# Patient Record
Sex: FEMALE | Race: WHITE | Employment: OTHER | URBAN - METROPOLITAN AREA
[De-identification: names, ages, dates, MRNs, and addresses within clinical notes are randomized per-mention and may not be internally consistent; named-entity substitution may affect disease eponyms.]

---

## 2019-09-17 ENCOUNTER — HOSPITAL ENCOUNTER (OUTPATIENT)
Dept: PHYSICAL THERAPY | Age: 74
Discharge: HOME | End: 2019-09-17
Attending: ORTHOPAEDIC SURGERY
Payer: MEDICARE

## 2019-09-17 PROCEDURE — 97016 VASOPNEUMATIC DEVICE THERAPY: CPT | Performed by: PHYSICAL THERAPIST

## 2019-09-17 PROCEDURE — 97110 THERAPEUTIC EXERCISES: CPT | Performed by: PHYSICAL THERAPIST

## 2019-09-17 PROCEDURE — 97140 MANUAL THERAPY 1/> REGIONS: CPT | Performed by: PHYSICAL THERAPIST

## 2019-09-17 PROCEDURE — 97161 PT EVAL LOW COMPLEX 20 MIN: CPT | Performed by: PHYSICAL THERAPIST

## 2019-09-17 NOTE — PROGRESS NOTES
[facility]  [address]  [place], [address]  Phone: [phone]  Fax: [phone]    Plan of Care/Statement of Necessity for Physical Therapy Services  2-15    Patient name: Jose Manuel Nelson  : 1945  Provider#: [id number]  Referral source: Vivien Cleary MD      Medical/Treatment Diagnosis: Acute right ankle pain [M25.571]     Prior Hospitalization: see medical history     Comorbidities: see evaluation  Prior Level of Function: see evaluation  Medications: Verified on Patient Summary List  Start of Care: 19      Onset Date: 19   The Plan of Care and following information is based on the information from the initial evaluation. Assessment/ key information: Pt is a 72year old female presenting s/p R lateral malleolus fracture of R fibula on 19 from a fall. She will benefit from PT to address problem list below    Problem List: pain affecting function, decrease ROM, decrease strength, edema affecting function, impaired gait/ balance, decrease ADL/ functional abilitiies, decrease activity tolerance and decrease flexibility/ joint mobility   Treatment Plan may include any combination of the following: Therapeutic exercise, Therapeutic activities, Neuromuscular re-education, Physical agent/modality, Gait/balance training, Manual therapy, Patient education, Self Care training, Functional mobility training, Home safety training and Stair training  Patient / Family readiness to learn indicated by: asking questions, trying to perform skills and interest  Persons(s) to be included in education: patient (P)  Barriers to Learning/Limitations: None  Patient Goal (s): see evaluation  Patient Self Reported Health Status: good  Rehabilitation Potential: good    Long Term Goals:  To be accomplished in 6-8 weeks:  1) Pt will be independent in final HEP  2) Pt will be able to perform R SLS for >/=10 sec without pain in order to demonstrate improvement in proprioception  3) Pt will ambulate >/=100 ft in clinic with proper gait mechanics  4) Pt will ascend and descend >/=4 stairs with reciprocal gait pattern without R ankle pain  5) Pt will improve FOTO score to >/=62/100 in order to demonstrate improvement in functional mobility  6) Pt will demonstrate 5/5 R ankle strength all directions in order to assist in return to PLOF  7) Pt will improve R ankle DF to >/= +5 deg in order to be symmetrical with contralateral limb and assist in gait mechanics    Frequency / Duration: Patient to be seen 1-2 times per week for 6-8 weeks. Patient/ Caregiver education and instruction: self care, activity modification and exercises    [x]  Plan of care has been reviewed with PTA    Certification Period: 9/17/19- 12/11/19  Chilo Powers, PT 9/17/2019    ________________________________________________________________________    I certify that the above Therapy Services are being furnished while the patient is under my care. I agree with the treatment plan and certify that this therapy is necessary.     [facility] Signature:____________________  Date:____________Time: _________

## 2019-09-17 NOTE — PROGRESS NOTES
[facility]  [address], [place], [address]  Phone: [phone]  Fax: [phone]      PT INITIAL EVALUATION NOTE - [facility] 2-15    Patient Name: Glenn Lopez  Date:2019  : 1945  [x]  Patient  Verified  Payor: Eveline Bush / Plan: [place] MED[place]ARE PART [place] & B / Product Type: Medicare /    In time: 4 A  Out time: 11:40 A  Total Treatment Time (min): 65  Total Timed Codes (min): 25  1:1 Treatment Time ([address] only): 55   Visit #: 1     Treatment Area: Acute right ankle pain [M25.571]     Subjective: Any medication changes, allergies to medications, adverse drug reactions, diagnosis change, or new procedure performed?: [] No    [x] Yes (see summary sheet for update)    Date of onset/injury and chief c/o:    Pt presents s/p R fibular fracture on 19 after a fall. She states a few weeks ago she had bronchitis, stood up to use bathroom in the middle of the night and fell onto her R ankle. Did not lose consciousness. Went to PCP- had x-rays taken- \"sprial fracture on fibular\". She was fitted for a boot at ortho-on-call then f/u with Dr. Raad Hart. Dr. Joey Edmonds suggested for her to wear boot only when walking and PT. She states that she wears boot \"most of the time\", however usually not when in the house and going from room to room. She states she rolled it last week when she went outside to check on something in her garden. She has been icing, elevating a lot. She returns to MD tomorrow. She is not driving. She has to go up/down stairs sideways, one step at at time.     Pain:   8/10 max 3/10 min 6/10 now       Location and description of symptoms: R ankle    Current symptoms/chief complaint:   Aggravated by: inc'd WBing, walking, activity  Eased by: rest, ice, elevation    Diagnostic Tests: [] Lab work [x] X-rays    [] CT [] MRI     [] Other:  Results (per report of the patient): see above    PMH: Significant for diabetes, high BP, arthritis    Social/Recreation/Work: not currently working. States she stays busy around the house, with her garden, running errands    Prior level of function: No history of R ankle pain, able to walk up/down stairs and walk without a limp    Patient goal(s): \"strengthen my ankle, facilitate healing\"      Objective:      Observation/posture:   Pt stands with WBing L>R LE. Toe ou on R    Gait:   Pt ambulates without boot into clinic today. Demonstrates antalgic gait pattern-- dec'd WBing through R LE, dec'd R ankle DF, inc'd R toe out       ROM/Strength        AROM  Strength (1-5)   Right Left Right Left   Dorsiflexion 0 (p!) +6 NT 5   Plantarflexion 38 (p!) 65 NT 5   Inversion 20 (p!) 40 NT 5   Eversion 25 (p!) 32 NT 5   Knee Flexion WNL WNL NT 5   Knee Extension WNL WNL NT 5       Palpation:  TTP:  R lateral malleolus, R fibularis longus/brevis    Outcome Measure: FOTO see chart     Swelling:   Right (cm) Left (cm)   Mid Malleoli cirfumference 26 cm 24.5 cm       Special Tests:     Single Leg Balance/Stork Test:  NT. Unable to perform                Today's Treatment: :    15 min Therapeutic Exercise:  [x] See flow sheet : instructed in HEP   Rationale: increase ROM and increase strength to improve the patients ability to walk, perform daily chores with dec'd symptoms    10 min Manual Therapy: STM/TPR R fibularis longus, brevis  PROM R ankle DF/[place], inver/ever    Rationale: decrease pain, increase ROM, increase tissue extensibility and decrease edema  to improve the patients ability to walk, exercise, perform daily chores with dec'd symptoms    10 min-- [place], R ankle with ice pack around R calf.           With   [x] TE   [] TA   [] neuro   [] other: Patient Education: [x] Review HEP    [] Progressed/Changed HEP based on:   [] positioning   [] body mechanics   [] transfers   [x] heat/ice application    [x] other: lance/phys; PT POC; importance of performing HEP in order to maximize benefit of PT; use of ice for 15 min several times daily in order to decrease swelling; importance of compliance with wearing boot when WBing for inc'd bouts of time     Pain Level (0-10 scale) post treatment: \"good, cold\"    Assessment:   [x] See POC    Plan:   [x] See POC  [] Other:     Yaniv Stevens, PT, DPT  9/17/2019

## 2019-09-19 ENCOUNTER — APPOINTMENT (OUTPATIENT)
Dept: PHYSICAL THERAPY | Age: 74
End: 2019-09-19
Attending: ORTHOPAEDIC SURGERY
Payer: MEDICARE

## 2019-09-23 ENCOUNTER — HOSPITAL ENCOUNTER (OUTPATIENT)
Dept: PHYSICAL THERAPY | Age: 74
Discharge: HOME | End: 2019-09-23
Attending: ORTHOPAEDIC SURGERY
Payer: MEDICARE

## 2019-09-23 PROCEDURE — 97140 MANUAL THERAPY 1/> REGIONS: CPT | Performed by: PHYSICAL THERAPIST

## 2019-09-23 PROCEDURE — 97016 VASOPNEUMATIC DEVICE THERAPY: CPT | Performed by: PHYSICAL THERAPIST

## 2019-09-23 PROCEDURE — 97110 THERAPEUTIC EXERCISES: CPT | Performed by: PHYSICAL THERAPIST

## 2019-09-23 NOTE — PROGRESS NOTES
PT DAILY TREATMENT NOTE - [facility] 2-15    Patient Name: Osmel Myers  Date:2019  : 1945  [x]  Patient  Verified  Payor: [place] MEDICARE / Plan: VA MEDICARE PART A & B / Product Type: Medicare /    In time:235 P  Out time: 340 P  Total Treatment Time (min): 65  Total Timed Codes (min): 50  1:1 Treatment Time ( only): 50   Visit #:  2    Treatment Area: Acute right ankle pain [M25.571]    SUBJECTIVE  Pain Level (0-10 scale): 1  Any medication changes, allergies to medications, adverse drug reactions, diagnosis change, or new procedure performed?: [x] No    [] Yes (see summary sheet for update)  Subjective functional status/changes:   [] No changes reported  Pt states that she has been very compliant with wearing boot. She was in [address] [facility] this weekend with her  taking care of their 2 grandchildren for the weekend-- states it was a 3 level townhouse but she wore her boot the whole time.  She still feels like there is a lot of swelling in her ankle and she continues to get some pain    OBJECTIVE    Modality rationale: decrease edema, decrease inflammation and decrease pain to improve the patients ability to walk, exercise, perform daily chores with dec'd symptoms   Min Type Additional Details       [] Estim: []Att   []Unatt    []TENS instruct                  []IFC  []Premod   []NMES                     []Other:  []w/US   []w/ice   []w/heat  Position:  Location:       []  Traction: [] Cervical       []Lumbar                       [] Prone          []Supine                       []Intermittent   []Continuous Lbs:  [] before manual  [] after manual  []w/heat    []  Ultrasound: []Continuous   [] Pulsed                       at: []1MHz   []3MHz Location:  W/cm2:    [] Paraffin         Location:   []w/heat    []  Ice     []  Heat  []  Ice massage Position:  Location:    []  Laser  []  Other: Position:  Location:   15   [x]  Vasopneumatic Device  +compression in order to reduce edema Pressure:       [] lo [x] med [] hi   Temperature: 34     [x] Skin assessment post-treatment:  [x]intact []redness- no adverse reaction    []redness  adverse reaction:      35 min Therapeutic Exercise:  [x] See flow sheet : instructed in HEP   Rationale: increase ROM and increase strength to improve the patients ability to walk, perform daily chores with dec'd symptoms     15 min Manual Therapy: STM/TPR R fibularis longus, brevis  PROM R ankle DF/[place], inver/ever    Rationale: decrease pain, increase ROM, increase tissue extensibility and decrease edema  to improve the patients ability to walk, exercise, perform daily chores with dec'd symptoms     min Gait Training:  ___ feet with ___ device on level surfaces with ___ level of assist   Rationale: With   [x] TE   [] TA   [] neuro   [] other: Patient Education: [x] Review HEP    [] Progressed/Changed HEP based on:   [] positioning   [] body mechanics   [] transfers   [] heat/ice application    [] other:      Other Objective/Functional Measures: n/a     Pain Level (0-10 scale) post treatment: 1    ASSESSMENT/Changes in Function:   Challenged with inv/ever on [employer]. Updated HEP to include SLS clocks and tandem balance. Strongly encouraged pt to continue wearing boot at this time per MD recommendation    Patient will continue to benefit from skilled PT services to modify and progress therapeutic interventions, address functional mobility deficits, address ROM deficits, address strength deficits, analyze and address soft tissue restrictions, analyze and cue movement patterns and analyze and modify body mechanics/ergonomics to attain remaining goals.      [x]  See Plan of Care  []  See progress note/recertification  []  See Discharge Summary         Progress towards goals / Updated goals:  nt    PLAN  []  Upgrade activities as tolerated     [x]  Continue plan of care  []  Update interventions per flow sheet       []  Discharge due to:_  []  Other:_      Marygrace Mortimer, PT 9/23/2019

## 2019-09-25 ENCOUNTER — HOSPITAL ENCOUNTER (OUTPATIENT)
Dept: PHYSICAL THERAPY | Age: 74
Discharge: HOME | End: 2019-09-25
Attending: ORTHOPAEDIC SURGERY
Payer: MEDICARE

## 2019-09-25 PROCEDURE — 97016 VASOPNEUMATIC DEVICE THERAPY: CPT | Performed by: PHYSICAL THERAPY ASSISTANT

## 2019-09-25 PROCEDURE — 97535 SELF CARE MNGMENT TRAINING: CPT | Performed by: PHYSICAL THERAPY ASSISTANT

## 2019-09-25 PROCEDURE — 97140 MANUAL THERAPY 1/> REGIONS: CPT | Performed by: PHYSICAL THERAPY ASSISTANT

## 2019-09-25 NOTE — PROGRESS NOTES
PT DAILY TREATMENT NOTE - [facility] 2-15    Patient Name: Doug Castillo  Date:2019  : 1945  [x]  Patient  Verified  Payor: [place] MEDICARE / Plan: [place] MEDICARE PART A & B / Product Type: Medicare /    In time:11:00 AM Out time: 12:00  P  Total Treatment Time (min): 60  Total Timed Codes (min): 50  1:1 Treatment Time ( only): 25   Visit #:  3    Treatment Area: Acute right ankle pain [M25.571]    SUBJECTIVE  Pain Level (0-10 scale): 1  Any medication changes, allergies to medications, adverse drug reactions, diagnosis change, or new procedure performed?: [x] No    [] Yes (see summary sheet for update)  Subjective functional status/changes:   [] No changes reported  Pt states she did a lot of cleaning around the kitchen yesterday as she had ordered a new refrigerator. States she wore her boot the entire time but was feeling a little more swollen and sore today.      OBJECTIVE    Modality rationale: decrease edema, decrease inflammation and decrease pain to improve the patients ability to walk, exercise, perform daily chores with dec'd symptoms   Min Type Additional Details       [] Estim: []Att   []Unatt    []TENS instruct                  []IFC  []Premod   []NMES                     []Other:  []w/US   []w/ice   []w/heat  Position:  Location:       []  Traction: [] Cervical       []Lumbar                       [] Prone          []Supine                       []Intermittent   []Continuous Lbs:  [] before manual  [] after manual  []w/heat    []  Ultrasound: []Continuous   [] Pulsed                       at: []1MHz   []3MHz Location:  W/cm2:    [] Paraffin         Location:   []w/heat    []  Ice     []  Heat  []  Ice massage Position:  Location:    []  Laser  []  Other: Position:  Location:   10   [x]  Vasopneumatic Device (boot)  +compression in order to reduce edema Pressure:       [] lo [x] med [] hi   Temperature: 34     [x] Skin assessment post-treatment:  [x]intact []redness- no adverse reaction []redness  adverse reaction:      25 min Therapeutic Exercise:  [x] See flow sheet : instructed in HEP   Rationale: increase ROM and increase strength to improve the patients ability to walk, perform daily chores with dec'd symptoms     15 min Manual Therapy: STM/TPR R fibularis longus, brevis  PROM R ankle DF/[place], inver/ever    Rationale: decrease pain, increase ROM, increase tissue extensibility and decrease edema  to improve the patients ability to walk, exercise, perform daily chores with dec'd symptoms     min Gait Training:  ___ feet with ___ device on level surfaces with ___ level of assist   Rationale: With   [] TE   [] TA   [] neuro   [x] other: Self Care/Patient Education: [x] Review HEP    [] Progressed/Changed HEP based on:   [] positioning   [] body mechanics   [] transfers   [x] heat/ice application    [x] other: measured patients ankle for compression stockings and gave printout regarding appropriate size and to wear them during the day to reduce effusion to R ankle. Other Objective/Functional Measures:   Mid Calf Circumference: 15.5 inches  Malleoli level Circumference: 10.5 inches     Pain Level (0-10 scale) post treatment:  \"the boot really made it feel good. \"    ASSESSMENT/Changes in Function:   Patient tolerated exercises well today, slight increase in swelling today so encouraged patient to look into compression stockings to wear throughout the day to reduce swelling with increased activity. Continued to recommend patient wear her boot per MD recommendations. Patient will continue to benefit from skilled PT services to modify and progress therapeutic interventions, address functional mobility deficits, address ROM deficits, address strength deficits, analyze and address soft tissue restrictions, analyze and cue movement patterns and analyze and modify body mechanics/ergonomics to attain remaining goals.      [x]  See Plan of Care  []  See progress note/recertification  []  See Discharge Summary         Progress towards goals / Updated goals:  nt    PLAN  []  Upgrade activities as tolerated     [x]  Continue plan of care  []  Update interventions per flow sheet       []  Discharge due to:_  []  Other:_      Iliana Keys PTA 9/25/2019

## 2019-10-01 ENCOUNTER — APPOINTMENT (OUTPATIENT)
Dept: PHYSICAL THERAPY | Age: 74
End: 2019-10-01
Attending: ORTHOPAEDIC SURGERY
Payer: MEDICARE

## 2019-10-03 ENCOUNTER — HOSPITAL ENCOUNTER (OUTPATIENT)
Dept: PHYSICAL THERAPY | Age: 74
Discharge: HOME | End: 2019-10-03
Attending: ORTHOPAEDIC SURGERY
Payer: MEDICARE

## 2019-10-03 PROCEDURE — 97140 MANUAL THERAPY 1/> REGIONS: CPT | Performed by: PHYSICAL THERAPIST

## 2019-10-03 PROCEDURE — 97110 THERAPEUTIC EXERCISES: CPT | Performed by: PHYSICAL THERAPIST

## 2019-10-03 PROCEDURE — 97016 VASOPNEUMATIC DEVICE THERAPY: CPT | Performed by: PHYSICAL THERAPIST

## 2019-10-03 NOTE — PROGRESS NOTES
PT DAILY TREATMENT NOTE - [facility] 2-15    Patient Name: Thais Pate  Date:10/3/2019  : 1945  [x]  Patient  Verified  Payor: [place] MEDICARE / Plan: VA MEDICARE PART A & B / Product Type: Medicare /    In time:11:05 AM Out time: 12:05  P  Total Treatment Time (min): 60  Total Timed Codes (min): 40  1:1 Treatment Time ( [address] only): 45   Visit #:  4    Treatment Area: Acute right ankle pain [M25.571]    SUBJECTIVE  Pain Level (0-10 scale): 1  Any medication changes, allergies to medications, adverse drug reactions, diagnosis change, or new procedure performed?: [x] No    [] Yes (see summary sheet for update)  Subjective functional status/changes:   [] No changes reported  Pt reports that she has been weaning herself from the boot- doesn't wear it unless she will be walking/on her feet for an extended period of time or on uneven surfaces. She did not wear boot at all yesterday or this morning.  Overall feels that is it getting better  She has bronchitis, has been on anibiotics for 4 days, which is why she missed her last PT appointment    OBJECTIVE    Modality rationale: decrease edema, decrease inflammation and decrease pain to improve the patients ability to walk, exercise, perform daily chores with dec'd symptoms   Min Type Additional Details       [] Estim: []Att   []Unatt    []TENS instruct                  []IFC  []Premod   []NMES                     []Other:  []w/US   []w/ice   []w/heat  Position:  Location:       []  Traction: [] Cervical       []Lumbar                       [] Prone          []Supine                       []Intermittent   []Continuous Lbs:  [] before manual  [] after manual  []w/heat    []  Ultrasound: []Continuous   [] Pulsed                       at: []1MHz   []3MHz Location:  W/cm2:    [] Paraffin         Location:   []w/heat    []  Ice     []  Heat  []  Ice massage Position:  Location:    []  Laser  []  Other: Position:  Location:   10   [x]  Vasopneumatic Device (boot)  +compression in order to reduce edema Pressure:       [] lo [x] med [] hi   Temperature: 34     [x] Skin assessment post-treatment:  [x]intact []redness- no adverse reaction    []redness  adverse reaction:      25 min Therapeutic Exercise:  [x] See flow sheet : instructed in HEP   Rationale: increase ROM and increase strength to improve the patients ability to walk, perform daily chores with dec'd symptoms     15 min Manual Therapy: STM/TPR R fibularis longus, brevis  PROM R ankle DF/AR, inver/ever    Rationale: decrease pain, increase ROM, increase tissue extensibility and decrease edema  to improve the patients ability to walk, exercise, perform daily chores with dec'd symptoms     min Gait Training:  ___ feet with ___ device on level surfaces with ___ level of assist   Rationale: With   [] TE   [] TA   [] neuro   [x] other: Self Care/Patient Education: [x] Review HEP    [] Progressed/Changed HEP based on:   [] positioning   [] body mechanics   [] transfers   [x] heat/ice application    [x] other:      Other Objective/Functional Measures:   n/a     Pain Level (0-10 scale) post treatment:  0/10 \"feels good now! \"    ASSESSMENT/Changes in Function:   Challenged with SLS on foam today; encouraged pt wean from boot as tolerated. Patient will continue to benefit from skilled PT services to modify and progress therapeutic interventions, address functional mobility deficits, address ROM deficits, address strength deficits, analyze and address soft tissue restrictions, analyze and cue movement patterns and analyze and modify body mechanics/ergonomics to attain remaining goals.      [x]  See Plan of Care  []  See progress note/recertification  []  See Discharge Summary         Progress towards goals / Updated goals:  nt    PLAN  []  Upgrade activities as tolerated     [x]  Continue plan of care  []  Update interventions per flow sheet       []  Discharge due to:_  []  Other:_      Vashti Hong, PT 10/3/2019

## 2019-10-09 ENCOUNTER — HOSPITAL ENCOUNTER (OUTPATIENT)
Dept: PHYSICAL THERAPY | Age: 74
Discharge: HOME | End: 2019-10-09
Attending: ORTHOPAEDIC SURGERY
Payer: MEDICARE

## 2019-10-09 PROCEDURE — 97140 MANUAL THERAPY 1/> REGIONS: CPT | Performed by: PHYSICAL THERAPIST

## 2019-10-09 PROCEDURE — 97016 VASOPNEUMATIC DEVICE THERAPY: CPT | Performed by: PHYSICAL THERAPIST

## 2019-10-09 PROCEDURE — 97110 THERAPEUTIC EXERCISES: CPT | Performed by: PHYSICAL THERAPIST

## 2019-10-09 NOTE — PROGRESS NOTES
PT DAILY TREATMENT NOTE - [facility] 2-15    Patient Name: Agueda Agent  Date:10/9/2019  : 1945  [x]  Patient  Verified  Payor: VA MEDICARE / Plan: VA MEDICARE PART A & B / Product Type: Medicare /    In time: 12:05 Out time: 1:10  P  Total Treatment Time (min): 65  Total Timed Codes (min): 50  1:1 Treatment Time ( [address] only): 30   Visit #:  5    Treatment Area: Acute right ankle pain [M25.571]    SUBJECTIVE  Pain Level (0-10 scale): 1-2  Any medication changes, allergies to medications, adverse drug reactions, diagnosis change, or new procedure performed?: [x] No    [] Yes (see summary sheet for update)  Subjective functional status/changes:   [] No changes reported  Pt states that she saw MD-- she was not very happy bc he did not spend very much time with her. \"I had a lot of questions I wanted to ask him but didn't get to\". She states that she told him she was driving. He told her x-rays look good, that it would not be completely healed until 10-12 weeks. She is out of her boot completely, has lace up ankle brace to wear if needed. She went to her grandson's soccer game- had to walk across a field. States some increased soreness in ankle.  She still has some pain/soreness on the outside (lateral aspect) of the ankle    OBJECTIVE    Modality rationale: decrease edema, decrease inflammation and decrease pain to improve the patients ability to walk, exercise, perform daily chores with dec'd symptoms   Min Type Additional Details       [] Estim: []Att   []Unatt    []TENS instruct                  []IFC  []Premod   []NMES                     []Other:  []w/US   []w/ice   []w/heat  Position:  Location:       []  Traction: [] Cervical       []Lumbar                       [] Prone          []Supine                       []Intermittent   []Continuous Lbs:  [] before manual  [] after manual  []w/heat    []  Ultrasound: []Continuous   [] Pulsed                       at: []1MHz   []3MHz Location:  W/cm2:    [] Paraffin Location:   []w/heat    []  Ice     []  Heat  []  Ice massage Position:  Location:    []  Laser  []  Other: Position:  Location:   15   [x]  Vasopneumatic Device (boot)  +compression in order to reduce edema Pressure:       [] lo [x] med [] hi   Temperature: 34     [x] Skin assessment post-treatment:  [x]intact []redness- no adverse reaction    []redness  adverse reaction:      35 min Therapeutic Exercise:  [x] See flow sheet : progressed per flow sheet   Rationale: increase ROM and increase strength to improve the patients ability to walk, perform daily chores with dec'd symptoms     15 min Manual Therapy: STM/TPR R fibularis longus, brevis  PROM R ankle DF/[place], inver/ever    Rationale: decrease pain, increase ROM, increase tissue extensibility and decrease edema  to improve the patients ability to walk, exercise, perform daily chores with dec'd symptoms        With   [] TE   [] TA   [] neuro   [x] other: Self Care/Patient Education: [x] Review HEP    [] Progressed/Changed HEP based on:   [] positioning   [] body mechanics   [] transfers   [x] heat/ice application    [x] other:      Other Objective/Functional Measures:   n/a     Pain Level (0-10 scale) post treatment:  0/10     ASSESSMENT/Changes in Function:   Added resistance band with ankle AROM. Progressing well with balance, ankle strengthening    Patient will continue to benefit from skilled PT services to modify and progress therapeutic interventions, address functional mobility deficits, address ROM deficits, address strength deficits, analyze and address soft tissue restrictions, analyze and cue movement patterns and analyze and modify body mechanics/ergonomics to attain remaining goals.      [x]  See Plan of Care  []  See progress note/recertification  []  See Discharge Summary         Progress towards goals / Updated goals:  nt    PLAN  []  Upgrade activities as tolerated     [x]  Continue plan of care  []  Update interventions per flow sheet []  Discharge due to:_  []  Other:_      Ronaldo Harris, PT 10/9/2019

## 2019-10-11 ENCOUNTER — HOSPITAL ENCOUNTER (OUTPATIENT)
Dept: PHYSICAL THERAPY | Age: 74
Discharge: HOME | End: 2019-10-11
Attending: ORTHOPAEDIC SURGERY
Payer: MEDICARE

## 2019-10-11 PROCEDURE — 97140 MANUAL THERAPY 1/> REGIONS: CPT | Performed by: PHYSICAL THERAPIST

## 2019-10-11 PROCEDURE — 97016 VASOPNEUMATIC DEVICE THERAPY: CPT | Performed by: PHYSICAL THERAPIST

## 2019-10-11 PROCEDURE — 97110 THERAPEUTIC EXERCISES: CPT | Performed by: PHYSICAL THERAPIST

## 2019-10-11 NOTE — PROGRESS NOTES
PT DAILY TREATMENT NOTE - [facility] 2-15    Patient Name: Abel Escamilla  Date:10/11/2019  : 1945  [x]  Patient  Verified  Payor: [place] MED[place] / Plan: [place] MEDICARE PART A & B / Product Type: Medicare /    In time: 11:00 A Out time: 12:10  P  Total Treatment Time (min): 70  Total Timed Codes (min): 55  1:1 Treatment Time ( [address] only): 55   Visit #:  6    Treatment Area: Acute right ankle pain [M25.571]    SUBJECTIVE  Pain Level (0-10 scale): \"just a ache\"  Any medication changes, allergies to medications, adverse drug reactions, diagnosis change, or new procedure performed?: [x] No    [] Yes (see summary sheet for update)  Subjective functional status/changes:   [] No changes reported  Pt reports that her ankle is doing well, \"I wouldn't say its painful, just an ache\".  She states she is still cautious with how much activity she is doing    OBJECTIVE    Modality rationale: decrease edema, decrease inflammation and decrease pain to improve the patients ability to walk, exercise, perform daily chores with dec'd symptoms   Min Type Additional Details       [] Estim: []Att   []Unatt    []TENS instruct                  []IFC  []Premod   []NMES                     []Other:  []w/US   []w/ice   []w/heat  Position:  Location:       []  Traction: [] Cervical       []Lumbar                       [] Prone          []Supine                       []Intermittent   []Continuous Lbs:  [] before manual  [] after manual  []w/heat    []  Ultrasound: []Continuous   [] Pulsed                       at: []1MHz   []3MHz Location:  W/cm2:    [] Paraffin         Location:   []w/heat    []  Ice     []  Heat  []  Ice massage Position:  Location:    []  Laser  []  Other: Position:  Location:   15   [x]  Vasopneumatic Device (boot)  +compression in order to reduce edema Pressure:       [] lo [x] med [] hi   Temperature: 34     [x] Skin assessment post-treatment:  [x]intact []redness- no adverse reaction    []redness  adverse reaction:      40 min Therapeutic Exercise:  [x] See flow sheet : progressed per flow sheet   Rationale: increase ROM and increase strength to improve the patients ability to walk, perform daily chores with dec'd symptoms     15 min Manual Therapy: STM/TPR R fibularis longus, brevis  PROM R ankle DF/DE, inver/ever    Rationale: decrease pain, increase ROM, increase tissue extensibility and decrease edema  to improve the patients ability to walk, exercise, perform daily chores with dec'd symptoms        With   [] TE   [] TA   [] neuro   [x] other: Self Care/Patient Education: [x] Review HEP    [] Progressed/Changed HEP based on:   [] positioning   [] body mechanics   [] transfers   [x] heat/ice application    [x] other:      Other Objective/Functional Measures:   n/a     Pain Level (0-10 scale) post treatment:  0/10     ASSESSMENT/Changes in Function:   Progressing well with balance training and progression of strength exercises    Patient will continue to benefit from skilled PT services to modify and progress therapeutic interventions, address functional mobility deficits, address ROM deficits, address strength deficits, analyze and address soft tissue restrictions, analyze and cue movement patterns and analyze and modify body mechanics/ergonomics to attain remaining goals.      [x]  See Plan of Care  []  See progress note/recertification  []  See Discharge Summary         Progress towards goals / Updated goals:  nt    PLAN  []  Upgrade activities as tolerated     [x]  Continue plan of care  []  Update interventions per flow sheet       []  Discharge due to:_  []  Other:_      Remedios Hassan, PT 10/11/2019

## 2019-10-15 ENCOUNTER — HOSPITAL ENCOUNTER (OUTPATIENT)
Dept: PHYSICAL THERAPY | Age: 74
Discharge: HOME | End: 2019-10-15
Attending: ORTHOPAEDIC SURGERY
Payer: MEDICARE

## 2019-10-15 PROCEDURE — 97016 VASOPNEUMATIC DEVICE THERAPY: CPT | Performed by: PHYSICAL THERAPIST

## 2019-10-15 PROCEDURE — 97110 THERAPEUTIC EXERCISES: CPT | Performed by: PHYSICAL THERAPIST

## 2019-10-15 PROCEDURE — 97140 MANUAL THERAPY 1/> REGIONS: CPT | Performed by: PHYSICAL THERAPIST

## 2019-10-18 ENCOUNTER — HOSPITAL ENCOUNTER (OUTPATIENT)
Dept: PHYSICAL THERAPY | Age: 74
Discharge: HOME | End: 2019-10-18
Attending: ORTHOPAEDIC SURGERY
Payer: MEDICARE

## 2019-10-18 PROCEDURE — 97140 MANUAL THERAPY 1/> REGIONS: CPT | Performed by: PHYSICAL THERAPIST

## 2019-10-18 PROCEDURE — 97110 THERAPEUTIC EXERCISES: CPT | Performed by: PHYSICAL THERAPIST

## 2019-10-18 NOTE — PROGRESS NOTES
PT DAILY TREATMENT/Progress NOTE - [facility] 2-15    Patient Name: Nilesh Olivo  Date:10/18/2019  : 1945  [x]  Patient  Verified  Payor: [place] MEDICARE / Plan: [place] MEDICARE PART A & B / Product Type: Medicare /    In time: 12:30 P Out time: 140 P  Total Treatment Time (min): 70  Total Timed Codes (min): 60  1:1 Treatment Time ([facility] only): 40   Visit #:  8    Treatment Area: Acute right ankle pain [M25.571]    SUBJECTIVE  Pain Level (0-10 scale): < 1/10  Any medication changes, allergies to medications, adverse drug reactions, diagnosis change, or new procedure performed?: [x] No    [] Yes (see summary sheet for update)  Subjective functional status/changes:   [] No changes reported  Pt reports that she has \"a little something\" in ankle today- \"just a little, its not bad at all\"    OBJECTIVE    Modality rationale: decrease edema, decrease inflammation and decrease pain to improve the patients ability to walk, exercise, perform daily chores with dec'd symptoms   Min Type Additional Details       [] Estim: []Att   []Unatt    []TENS instruct                  []IFC  []Premod   []NMES                     []Other:  []w/US   []w/ice   []w/heat  Position:  Location:       []  Traction: [] Cervical       []Lumbar                       [] Prone          []Supine                       []Intermittent   []Continuous Lbs:  [] before manual  [] after manual  []w/heat    []  Ultrasound: []Continuous   [] Pulsed                       at: []1MHz   []3MHz Location:  W/cm2:    [] Paraffin         Location:   []w/heat    []  Ice     []  Heat  []  Ice massage Position:  Location:    []  Laser  []  Other: Position:  Location:   10   [x]  Vasopneumatic Device (boot)  +compression in order to reduce edema Pressure:       [] lo [x] med [] hi   Temperature: 34     [x] Skin assessment post-treatment:  [x]intact []redness- no adverse reaction    []redness  adverse reaction:      45 min Therapeutic Exercise:  [x] See flow sheet : progressed per flow sheet; measurements taken for MD note. Rationale: increase ROM and increase strength to improve the patients ability to walk, perform daily chores with dec'd symptoms     15 min Manual Therapy: STM/TPR R fibularis longus, brevis  PROM R ankle DF/AR, inver/ever    Rationale: decrease pain, increase ROM, increase tissue extensibility and decrease edema  to improve the patients ability to walk, exercise, perform daily chores with dec'd symptoms        With   [] TE   [] TA   [] neuro   [x] other: Self Care/Patient Education: [x] Review HEP    [] Progressed/Changed HEP based on:   [] positioning   [] body mechanics   [] transfers   [x] heat/ice application    [x] other:      Other Objective/Functional Measures:   Pt ambulates independently without AD. Good gait mechanics     ROM/Strength                                          AROM             Strength (1-5)    Right Left Right Left   Dorsiflexion +8 +6 NT 5   Plantarflexion 50 65 NT 5   Inversion 28 40 NT 5   Eversion 26 32 NT 5   Knee Flexion WNL WNL NT 5   Knee Extension WNL WNL NT 5       Palpation:  Min TTP R lateral malleolus and distal tibula  No TTP R fibularis longus/brevis     Outcome Measure: FOTO see chart      Swelling:    Right (cm) Left (cm)   Mid Malleoli cirfumference 25.5 cm 24.5 cm         Special Tests:     Single Leg Balance/Stork Test:  R: approx 3 sec  L: approx 3 sec      Pain Level (0-10 scale) post treatment:  0/10     ASSESSMENT/Changes in Function:   Pt has completed 8 skilled PT visits. Pt demonstrates improvement in R ankle strength, ROM, gait mechanics, and proprioception.  Pt to continue PT 1-2x/wk for additional 3-4 weeks to progress strengthening, proprioceptive exercises    Patient will continue to benefit from skilled PT services to modify and progress therapeutic interventions, address functional mobility deficits, address ROM deficits, address strength deficits, analyze and address soft tissue restrictions, analyze and cue movement patterns and analyze and modify body mechanics/ergonomics to attain remaining goals. [x]  See Plan of Care  []  See progress note/recertification  []  See Discharge Summary         Progress towards goals / Updated goals:  Long Term Goals:  To be accomplished in 6-8 weeks:  1) Pt will be independent in final HEP MET  2) Pt will be able to perform R SLS for >/=10 sec without pain in order to demonstrate improvement in proprioception progressing  3) Pt will ambulate >/=100 ft in clinic with proper gait mechanics MET  4) Pt will ascend and descend >/=4 stairs with reciprocal gait pattern without R ankle pain  na  5) Pt will improve FOTO score to >/=62/100 in order to demonstrate improvement in functional mobility na  6) Pt will demonstrate 5/5 R ankle strength all directions in order to assist in return to PLOF MET  7) Pt will improve R ankle DF to >/= +5 deg in order to be symmetrical with contralateral limb and assist in gait mechanics MET    PLAN  []  Upgrade activities as tolerated     [x]  Continue plan of care  []  Update interventions per flow sheet       []  Discharge due to:_  [x]  Other:_  Continue PT 1-2x/wk for additional 3-4 weeks    Skye Kelly, PT 10/18/2019

## 2019-10-21 ENCOUNTER — HOSPITAL ENCOUNTER (OUTPATIENT)
Dept: PHYSICAL THERAPY | Age: 74
Discharge: HOME | End: 2019-10-21
Attending: ORTHOPAEDIC SURGERY
Payer: MEDICARE

## 2019-10-21 PROCEDURE — 97140 MANUAL THERAPY 1/> REGIONS: CPT | Performed by: PHYSICAL THERAPIST

## 2019-10-21 PROCEDURE — 97110 THERAPEUTIC EXERCISES: CPT | Performed by: PHYSICAL THERAPIST

## 2019-10-23 ENCOUNTER — HOSPITAL ENCOUNTER (OUTPATIENT)
Dept: PHYSICAL THERAPY | Age: 74
Discharge: HOME | End: 2019-10-23
Attending: ORTHOPAEDIC SURGERY
Payer: MEDICARE

## 2019-10-23 PROCEDURE — 97140 MANUAL THERAPY 1/> REGIONS: CPT | Performed by: PHYSICAL THERAPIST

## 2019-10-23 PROCEDURE — 97110 THERAPEUTIC EXERCISES: CPT | Performed by: PHYSICAL THERAPIST

## 2019-10-23 NOTE — PROGRESS NOTES
PT DAILY TREATMENT NOTE - [facility] 2-15    Patient Name: Agueda Agent  Date:10/23/2019  : 1945  [x]  Patient  Verified  Payor: [place] MEDICARE / Plan: [place] MEDICARE PART A & B / Product Type: Medicare /    In time: 1:00 PM   Out time: 2:25 PM  Total Treatment Time (min): 85  Total Timed Codes (min): 65  1:1 Treatment Time ([address] only): 30   Visit #:  10    Treatment Area: Acute right ankle pain [M25.571]    SUBJECTIVE  Pain Level (0-10 scale):  0/10  Any medication changes, allergies to medications, adverse drug reactions, diagnosis change, or new procedure performed?: [x] No    [] Yes (see summary sheet for update)  Subjective functional status/changes:   [] No changes reported  Pt reports that her ankle has been feeling much better; overall doing well. She is leaving town to visit her sister later this week, returns to [place] on .      OBJECTIVE    Modality rationale: decrease edema, decrease inflammation and decrease pain to improve the patients ability to walk, exercise, perform daily chores with dec'd symptoms   Min Type Additional Details       [] Estim: []Att   []Unatt    []TENS instruct                  []IFC  []Premod   []NMES                     []Other:  []w/US   []w/ice   []w/heat  Position:  Location:       []  Traction: [] Cervical       []Lumbar                       [] Prone          []Supine                       []Intermittent   []Continuous Lbs:  [] before manual  [] after manual  []w/heat    []  Ultrasound: []Continuous   [] Pulsed                       at: []1MHz   []3MHz Location:  W/cm2:    [] Paraffin         Location:   []w/heat   10 [x]  Ice     []  Heat  []  Ice massage Position:  Location:    []  Laser  []  Other: Position:  Location:   --   [x]  Vasopneumatic Device (boot)  +compression in order to reduce edema Pressure:       [] lo [x] med [] hi   Temperature: 34     [x] Skin assessment post-treatment:  [x]intact []redness- no adverse reaction    []redness  adverse reaction:      50 min Therapeutic Exercise:  [x] See flow sheet : progressed per flow sheet   Rationale: increase ROM and increase strength to improve the patients ability to walk, perform daily chores with dec'd symptoms     15 min Manual Therapy: STM/TPR R fibularis longus, brevis  PROM R ankle DF/NE, inver/ever    Rationale: decrease pain, increase ROM, increase tissue extensibility and decrease edema  to improve the patients ability to walk, exercise, perform daily chores with dec'd symptoms        With   [] TE   [] TA   [] neuro   [x] other: Self Care/Patient Education: [x] Review HEP    [] Progressed/Changed HEP based on:   [] positioning   [] body mechanics   [] transfers   [x] heat/ice application    [x] other:      Other Objective/Functional Measures:   n/a    Pain Level (0-10 scale) post treatment:  0/10     ASSESSMENT/Changes in Function:   Marlene addition of [facility] step ups well today. Encouraged pt to make f/u PT appt after MD appointment. Patient will continue to benefit from skilled PT services to modify and progress therapeutic interventions, address functional mobility deficits, address ROM deficits, address strength deficits, analyze and address soft tissue restrictions, analyze and cue movement patterns and analyze and modify body mechanics/ergonomics to attain remaining goals. [x]  See Plan of Care  []  See progress note/recertification  []  See Discharge Summary         Progress towards goals / Updated goals:  Long Term Goals:  To be accomplished in 6-8 weeks:  1) Pt will be independent in final HEP MET  2) Pt will be able to perform R SLS for >/=10 sec without pain in order to demonstrate improvement in proprioception progressing  3) Pt will ambulate >/=100 ft in clinic with proper gait mechanics MET  4) Pt will ascend and descend >/=4 stairs with reciprocal gait pattern without R ankle pain  na  5) Pt will improve FOTO score to >/=62/100 in order to demonstrate improvement in functional mobility na  6) Pt will demonstrate 5/5 R ankle strength all directions in order to assist in return to PLOF MET  7) Pt will improve R ankle DF to >/= +5 deg in order to be symmetrical with contralateral limb and assist in gait mechanics MET    PLAN  []  Upgrade activities as tolerated     [x]  Continue plan of care  []  Update interventions per flow sheet       []  Discharge due to:_  [x]  Other:_  Continue PT 1-2x/wk for additional 3-4 weeks    Vashti Hong, PT 10/23/2019

## 2019-10-28 ENCOUNTER — APPOINTMENT (OUTPATIENT)
Dept: PHYSICAL THERAPY | Age: 74
End: 2019-10-28
Attending: ORTHOPAEDIC SURGERY
Payer: MEDICARE

## 2019-10-30 ENCOUNTER — APPOINTMENT (OUTPATIENT)
Dept: PHYSICAL THERAPY | Age: 74
End: 2019-10-30
Attending: ORTHOPAEDIC SURGERY
Payer: MEDICARE

## 2019-11-07 ENCOUNTER — HOSPITAL ENCOUNTER (OUTPATIENT)
Dept: PHYSICAL THERAPY | Age: 74
Discharge: HOME | End: 2019-11-07
Attending: ORTHOPAEDIC SURGERY
Payer: MEDICARE

## 2019-11-07 PROCEDURE — 97110 THERAPEUTIC EXERCISES: CPT | Performed by: PHYSICAL THERAPIST

## 2019-11-07 PROCEDURE — 97140 MANUAL THERAPY 1/> REGIONS: CPT | Performed by: PHYSICAL THERAPIST

## 2019-11-07 PROCEDURE — 97016 VASOPNEUMATIC DEVICE THERAPY: CPT | Performed by: PHYSICAL THERAPIST

## 2019-11-07 NOTE — PROGRESS NOTES
PT DAILY TREATMENT NOTE - [facility] 2-15    Patient Name: Xander El  Date:2019  : 1945  [x]  Patient  Verified  Payor: [place] MEDICARE / Plan: [place] MEDICARE PART A & B / Product Type: Medicare /    In time: 2:00 PM   Out time: 3:05 PM  Total Treatment Time (min): 65  Total Timed Codes (min): 50  1:1 Treatment Time ( [address] only): 50   Visit #:  11    Treatment Area: Acute right ankle pain [M25.571]    SUBJECTIVE  Pain Level (0-10 scale):  0/10  Any medication changes, allergies to medications, adverse drug reactions, diagnosis change, or new procedure performed?: [x] No    [] Yes (see summary sheet for update)  Subjective functional status/changes:   [] No changes reported  Pt states that MD appt went well-- \"he said it is not quite 100% healed, but its going in that direction\".  Overall good report from MD.   Has been in [place] the past week visiting her sister-- states she felt good walking around/shopping with her sister    OBJECTIVE    Modality rationale: decrease edema, decrease inflammation and decrease pain to improve the patients ability to walk, exercise, perform daily chores with dec'd symptoms   Min Type Additional Details       [] Estim: []Att   []Unatt    []TENS instruct                  []IFC  []Premod   []NMES                     []Other:  []w/US   []w/ice   []w/heat  Position:  Location:       []  Traction: [] Cervical       []Lumbar                       [] Prone          []Supine                       []Intermittent   []Continuous Lbs:  [] before manual  [] after manual  []w/heat    []  Ultrasound: []Continuous   [] Pulsed                       at: []1MHz   []3MHz Location:  W/cm2:    [] Paraffin         Location:   []w/heat    [x]  Ice     []  Heat  []  Ice massage Position:  Location:    []  Laser  []  Other: Position:  Location:   10   [x]  Vasopneumatic Device (boot)  +compression in order to reduce edema Pressure:       [] lo [x] med [] hi   Temperature: 34     [x] Skin assessment post-treatment:  [x]intact []redness- no adverse reaction    []redness  adverse reaction:      35 min Therapeutic Exercise:  [x] See flow sheet : progressed per flow sheet   Rationale: increase ROM and increase strength to improve the patients ability to walk, perform daily chores with dec'd symptoms     15 min Manual Therapy: STM/TPR R fibularis longus, brevis  PROM R ankle DF/MI, inver/ever    Rationale: decrease pain, increase ROM, increase tissue extensibility and decrease edema  to improve the patients ability to walk, exercise, perform daily chores with dec'd symptoms        With   [] TE   [] TA   [] neuro   [x] other: Self Care/Patient Education: [x] Review HEP    [] Progressed/Changed HEP based on:   [] positioning   [] body mechanics   [] transfers   [x] heat/ice application    [x] other:      Other Objective/Functional Measures:   n/a    Pain Level (0-10 scale) post treatment:  0/10     ASSESSMENT/Changes in Function:   Slightly inc'd swelling R vs L ankle today. Progressing very well with strengthening program. Plan for 2 more visits, then D/C to HEP. Patient will continue to benefit from skilled PT services to modify and progress therapeutic interventions, address functional mobility deficits, address ROM deficits, address strength deficits, analyze and address soft tissue restrictions, analyze and cue movement patterns and analyze and modify body mechanics/ergonomics to attain remaining goals. [x]  See Plan of Care  []  See progress note/recertification  []  See Discharge Summary         Progress towards goals / Updated goals:  Long Term Goals:  To be accomplished in 6-8 weeks:  1) Pt will be independent in final HEP MET  2) Pt will be able to perform R SLS for >/=10 sec without pain in order to demonstrate improvement in proprioception progressing  3) Pt will ambulate >/=100 ft in clinic with proper gait mechanics MET  4) Pt will ascend and descend >/=4 stairs with reciprocal gait pattern without R ankle pain  na  5) Pt will improve FOTO score to >/=62/100 in order to demonstrate improvement in functional mobility na  6) Pt will demonstrate 5/5 R ankle strength all directions in order to assist in return to PLOF MET  7) Pt will improve R ankle DF to >/= +5 deg in order to be symmetrical with contralateral limb and assist in gait mechanics MET    PLAN  []  Upgrade activities as tolerated     [x]  Continue plan of care  []  Update interventions per flow sheet       []  Discharge due to:_  [x]  Other:_  Continue PT 1-2x/wk for additional 3-4 weeks    Onnie Morning, PT 11/7/2019

## 2019-11-12 ENCOUNTER — APPOINTMENT (OUTPATIENT)
Dept: PHYSICAL THERAPY | Age: 74
End: 2019-11-12
Attending: ORTHOPAEDIC SURGERY
Payer: MEDICARE

## 2019-11-18 ENCOUNTER — APPOINTMENT (OUTPATIENT)
Dept: PHYSICAL THERAPY | Age: 74
End: 2019-11-18
Attending: ORTHOPAEDIC SURGERY
Payer: MEDICARE

## 2019-11-20 ENCOUNTER — HOSPITAL ENCOUNTER (OUTPATIENT)
Dept: PHYSICAL THERAPY | Age: 74
Discharge: HOME | End: 2019-11-20
Attending: ORTHOPAEDIC SURGERY
Payer: MEDICARE

## 2019-11-20 PROCEDURE — 97110 THERAPEUTIC EXERCISES: CPT | Performed by: PHYSICAL THERAPIST

## 2019-11-20 NOTE — ANCILLARY DISCHARGE INSTRUCTIONS
[facility] [address] [facility] [place], [address] Phone: [phone]  Fax: [phone] Discharge Summary  2-15 Patient name: Audrey Vaughn  : 1945  Provider#:[id number] Referral source: Astrid Silva MD     
Medical/Treatment Diagnosis: Acute right ankle pain [M25.571] Prior Hospitalization: see medical history Comorbidities: see evaluation Prior Level of Function:see evaluation Medications: Verified on Patient Summary List 
 
Start of Care: 19      Onset Date:see evaluation Visits from Start of Care: 12     Missed Visits: see CC Reporting Period : 19 to 19 Summary of care:  
Pt ambulates independently without AD. Good gait mechanics 
  
ROM/Strength   
                                     AROM             Strength (1-5) 
  Right Left Right Left Dorsiflexion +8 +6 NT 5 Plantarflexion 58 65 NT 5 Inversion 28 30 NT 5 Eversion 26 32 NT 5 Knee Flexion WNL WNL NT 5 Knee Extension WNL WNL NT 5  
  
  
Palpation: 
N0 TTP R lateral malleolus and distal tibula No TTP R fibularis longus/brevis 
  
Outcome Measure: FOTO see chart  
  
Swelling: 
  Right (cm) Left (cm) Mid Malleoli cirfumference 25 cm 24.5 cm  
  
  
Special Tests: 
  
Single Leg Balance/Stork Test: 
R: approx 3 sec L: approx 3 sec 
  
Pain Level (0-10 scale) post treatment:  0/10 Progress towards goals Long Term Goals: To be accomplished in 6-8 weeks: 
1) Pt will be independent in final HEP MET 2) Pt will be able to perform R SLS for >/=10 sec without pain in order to demonstrate improvement in proprioception progressing 3) Pt will ambulate >/=100 ft in clinic with proper gait mechanics MET 4) Pt will ascend and descend >/=4 stairs with reciprocal gait pattern without R ankle pain  MET 5) Pt will improve FOTO score to >/=62/100 in order to demonstrate improvement in functional mobility  MET 
 6) Pt will demonstrate 5/5 R ankle strength all directions in order to assist in return to PLOF MET 7) Pt will improve R ankle DF to >/= +5 deg in order to be symmetrical with contralateral limb and assist in gait mechanics MET 
  
ASSESSMENT:  
Pt has completed 12 skilled PT visits. She has met 6/7 PT goals. She demonstrates improvement in gait mechanics, improved ankle strength, ROM, dec'd TTP, and improvement in functional mobility/actiivty tolerance. Pt continues to be challenged with balance exercises. Pt compliant, independent with HEP. Ready for D/C to HEP. RECOMMENDATIONS: 
[x]Discontinue therapy: [x]Patient has reached or is progressing toward set goals []Patient is non-compliant or has abdicated 
    []Due to lack of appreciable progress towards set goals Heather Jarrett, PT 11/20/2019 2:57 PM

## 2019-11-20 NOTE — PROGRESS NOTES
PT DAILY TREATMENT/Progress NOTE - [facility] 2-15    Patient Name: Tish Gillis  Date:2019  : 1945  [x]  Patient  Verified  [place]or: [place] MEDIC[place] / Plan: [place] MEDICARE PART A & B / Product Type: Medicare /    In time: 1130 AM   Out time: 12:35 PM  Total Treatment Time (min): 65  Total Timed Codes (min): 50  1:1 Treatment Time ( only): 45   Visit #:  12    Treatment Area: Acute right ankle pain [M25.571]    SUBJECTIVE  Pain Level (0-10 scale):  0/10  Any medication changes, allergies to medications, adverse drug reactions, diagnosis change, or new procedure performed?: [x] No    [] Yes (see summary sheet for update)  Subjective functional status/changes:   [] No changes reported  Pt reports that she has been running a lot of errands, been very busy the last few days and that she has barely noticed her ankle. She report she occasionally will \"know its there\" however minimal to no pain. \"The doctor said that is normal though\".     OBJECTIVE    Modality rationale: decrease edema, decrease inflammation and decrease pain to improve the patients ability to walk, exercise, perform daily chores with dec'd symptoms   Min Type Additional Details       [] Estim: []Att   []Unatt    []TENS instruct                  []IFC  []Premod   []NMES                     []Other:  []w/US   []w/ice   []w/heat  Position:  Location:       []  Traction: [] Cervical       []Lumbar                       [] Prone          []Supine                       []Intermittent   []Continuous Lbs:  [] before manual  [] after manual  []w/heat    []  Ultrasound: []Continuous   [] Pulsed                       at: []1MHz   []3MHz Location:  W/cm2:    [] Paraffin         Location:   []w/heat   10 [x]  Ice     []  Heat  []  Ice massage Position:  Location:    []  Laser  []  Other: Position:  Location:   --   [x]  Vasopneumatic Device (boot)  +compression in order to reduce edema Pressure:       [] lo [x] med [] hi   Temperature: 34     [x] Skin assessment post-treatment:  [x]intact []redness- no adverse reaction    []redness  adverse reaction:      50 min Therapeutic Exercise:  [x] See flow sheet : measurements taken for progress/discharge note; reviewed HEP   Rationale: increase ROM and increase strength to improve the patients ability to walk, perform daily chores with dec'd symptoms     0 min Manual Therapy: STM/TPR R fibularis longus, brevis  PROM R ankle DF/MD, inver/ever    Rationale: decrease pain, increase ROM, increase tissue extensibility and decrease edema  to improve the patients ability to walk, exercise, perform daily chores with dec'd symptoms        With   [] TE   [] TA   [] neuro   [x] other: Self Care/Patient Education: [x] Review HEP    [] Progressed/Changed HEP based on:   [] positioning   [] body mechanics   [] transfers   [x] heat/ice application    [x] other:      Other Objective/Functional Measures:   Pt ambulates independently without AD. Good gait mechanics     ROM/Strength                                          AROM             Strength (1-5)    Right Left Right Left   Dorsiflexion +8 +6 NT 5   Plantarflexion 58 65 NT 5   Inversion 28 30 NT 5   Eversion 26 32 NT 5   Knee Flexion WNL WNL NT 5   Knee Extension WNL WNL NT 5         Palpation:  N0 TTP R lateral malleolus and distal tibula  No TTP R fibularis longus/brevis     Outcome Measure: FOTO see chart      Swelling:    Right (cm) Left (cm)   Mid Malleoli cirfumference 25 cm 24.5 cm         Special Tests:     Single Leg Balance/Stork Test:  R: approx 3 sec  L: approx 3 sec    Pain Level (0-10 scale) post treatment:  0/10     ASSESSMENT/Changes in Function:   Pt has completed 12 skilled PT visits. She has met 6/7 PT goals. She demonstrates improvement in gait mechanics, improved ankle strength, ROM, dec'd TTP, and improvement in functional mobility/actiivty tolerance. Pt continues to be challenged with balance exercises. Pt compliant, independent with HEP. Ready for D/C to HEP.      [] See Plan of Care  []  See progress note/recertification  [x]  See Discharge Summary         Progress towards goals / Updated goals:  Long Term Goals: To be accomplished in 6-8 weeks:  1) Pt will be independent in final HEP MET  2) Pt will be able to perform R SLS for >/=10 sec without pain in order to demonstrate improvement in proprioception progressing  3) Pt will ambulate >/=100 ft in clinic with proper gait mechanics MET  4) Pt will ascend and descend >/=4 stairs with reciprocal gait pattern without R ankle pain  MET  5) Pt will improve FOTO score to >/=62/100 in order to demonstrate improvement in functional mobility  MET  6) Pt will demonstrate 5/5 R ankle strength all directions in order to assist in return to PLOF MET  7) Pt will improve R ankle DF to >/= +5 deg in order to be symmetrical with contralateral limb and assist in gait mechanics MET    PLAN  Discharge to [facility] due to pt met/progressing toward PT goals.     Oracio Mena, PT 11/20/2019

## 2022-01-14 ENCOUNTER — HOSPITAL ENCOUNTER (OUTPATIENT)
Dept: PREADMISSION TESTING | Age: 77
Discharge: HOME | End: 2022-01-14
Attending: INTERNAL MEDICINE
Payer: MEDICARE

## 2022-01-14 ENCOUNTER — TRANSCRIBE ORDER (OUTPATIENT)
Dept: REGISTRATION | Age: 77
End: 2022-01-14

## 2022-01-14 DIAGNOSIS — Z01.812 PRE-PROCEDURE LAB EXAM: Primary | ICD-10-CM

## 2022-01-14 DIAGNOSIS — Z01.812 PRE-PROCEDURE LAB EXAM: ICD-10-CM

## 2022-01-14 PROCEDURE — U0005 INFEC AGEN DETEC AMPLI PROBE: HCPCS

## 2022-01-16 LAB
SARS-COV-2, XPLCVT: NOT DETECTED
SOURCE, COVRS: NORMAL

## 2022-01-19 ENCOUNTER — ANESTHESIA EVENT (OUTPATIENT)
Dept: ENDOSCOPY | Age: 77
End: 2022-01-19
Payer: MEDICARE

## 2022-01-19 ENCOUNTER — HOSPITAL ENCOUNTER (OUTPATIENT)
Age: 77
Setting detail: OUTPATIENT SURGERY
Discharge: HOME | End: 2022-01-19
Attending: INTERNAL MEDICINE | Admitting: INTERNAL MEDICINE
Payer: MEDICARE

## 2022-01-19 ENCOUNTER — ANESTHESIA (OUTPATIENT)
Dept: ENDOSCOPY | Age: 77
End: 2022-01-19
Payer: MEDICARE

## 2022-01-19 VITALS
SYSTOLIC BLOOD PRESSURE: 133 MMHG | WEIGHT: 179 LBS | OXYGEN SATURATION: 94 % | DIASTOLIC BLOOD PRESSURE: 75 MMHG | RESPIRATION RATE: 18 BRPM | TEMPERATURE: 97.5 F | HEART RATE: 68 BPM | BODY MASS INDEX: 31.71 KG/M2 | HEIGHT: 63 IN

## 2022-01-19 PROCEDURE — 74011250637 HC RX REV CODE- 250/637: Performed by: INTERNAL MEDICINE

## 2022-01-19 PROCEDURE — 76040000019: Performed by: INTERNAL MEDICINE

## 2022-01-19 PROCEDURE — 76060000031 HC ANESTHESIA FIRST 0.5 HR: Performed by: INTERNAL MEDICINE

## 2022-01-19 PROCEDURE — 74011000250 HC RX REV CODE- 250: Performed by: NURSE ANESTHETIST, CERTIFIED REGISTERED

## 2022-01-19 PROCEDURE — 74011250636 HC RX REV CODE- 250/636: Performed by: NURSE ANESTHETIST, CERTIFIED REGISTERED

## 2022-01-19 PROCEDURE — 88305 TISSUE EXAM BY PATHOLOGIST: CPT

## 2022-01-19 PROCEDURE — 77030021593 HC FCPS BIOP ENDOSC BSC -A: Performed by: INTERNAL MEDICINE

## 2022-01-19 RX ORDER — MONTELUKAST SODIUM 10 MG/1
10 TABLET ORAL DAILY
COMMUNITY

## 2022-01-19 RX ORDER — POTASSIUM CHLORIDE 1.5 G/1.77G
20 POWDER, FOR SOLUTION ORAL 2 TIMES DAILY WITH MEALS
COMMUNITY

## 2022-01-19 RX ORDER — LIDOCAINE HYDROCHLORIDE 20 MG/ML
INJECTION, SOLUTION EPIDURAL; INFILTRATION; INTRACAUDAL; PERINEURAL AS NEEDED
Status: DISCONTINUED | OUTPATIENT
Start: 2022-01-19 | End: 2022-01-19 | Stop reason: HOSPADM

## 2022-01-19 RX ORDER — EPINEPHRINE 0.1 MG/ML
1 INJECTION INTRACARDIAC; INTRAVENOUS
Status: DISCONTINUED | OUTPATIENT
Start: 2022-01-19 | End: 2022-01-19 | Stop reason: HOSPADM

## 2022-01-19 RX ORDER — ATROPINE SULFATE 0.1 MG/ML
0.5 INJECTION INTRAVENOUS
Status: DISCONTINUED | OUTPATIENT
Start: 2022-01-19 | End: 2022-01-19 | Stop reason: HOSPADM

## 2022-01-19 RX ORDER — HYDROCHLOROTHIAZIDE 50 MG/1
25 TABLET ORAL DAILY
COMMUNITY

## 2022-01-19 RX ORDER — PROPOFOL 10 MG/ML
INJECTION, EMULSION INTRAVENOUS AS NEEDED
Status: DISCONTINUED | OUTPATIENT
Start: 2022-01-19 | End: 2022-01-19 | Stop reason: HOSPADM

## 2022-01-19 RX ORDER — SODIUM CHLORIDE 0.9 % (FLUSH) 0.9 %
5-40 SYRINGE (ML) INJECTION AS NEEDED
Status: DISCONTINUED | OUTPATIENT
Start: 2022-01-19 | End: 2022-01-19 | Stop reason: HOSPADM

## 2022-01-19 RX ORDER — FLUMAZENIL 0.1 MG/ML
0.2 INJECTION INTRAVENOUS
Status: DISCONTINUED | OUTPATIENT
Start: 2022-01-19 | End: 2022-01-19 | Stop reason: HOSPADM

## 2022-01-19 RX ORDER — SODIUM CHLORIDE 9 MG/ML
50 INJECTION, SOLUTION INTRAVENOUS CONTINUOUS
Status: DISCONTINUED | OUTPATIENT
Start: 2022-01-19 | End: 2022-01-19 | Stop reason: HOSPADM

## 2022-01-19 RX ORDER — LOSARTAN POTASSIUM 50 MG/1
TABLET ORAL DAILY
COMMUNITY

## 2022-01-19 RX ORDER — NALOXONE HYDROCHLORIDE 0.4 MG/ML
0.4 INJECTION, SOLUTION INTRAMUSCULAR; INTRAVENOUS; SUBCUTANEOUS
Status: DISCONTINUED | OUTPATIENT
Start: 2022-01-19 | End: 2022-01-19 | Stop reason: HOSPADM

## 2022-01-19 RX ORDER — PRAVASTATIN SODIUM 40 MG/1
40 TABLET ORAL
COMMUNITY

## 2022-01-19 RX ORDER — DABIGATRAN ETEXILATE 150 MG/1
CAPSULE ORAL EVERY 12 HOURS
COMMUNITY

## 2022-01-19 RX ORDER — SODIUM CHLORIDE 9 MG/ML
INJECTION, SOLUTION INTRAVENOUS
Status: DISCONTINUED | OUTPATIENT
Start: 2022-01-19 | End: 2022-01-19 | Stop reason: HOSPADM

## 2022-01-19 RX ORDER — METFORMIN HYDROCHLORIDE 500 MG/1
TABLET ORAL 2 TIMES DAILY WITH MEALS
COMMUNITY

## 2022-01-19 RX ORDER — DEXTROMETHORPHAN/PSEUDOEPHED 2.5-7.5/.8
1.2 DROPS ORAL
Status: DISCONTINUED | OUTPATIENT
Start: 2022-01-19 | End: 2022-01-19 | Stop reason: HOSPADM

## 2022-01-19 RX ORDER — SERTRALINE HYDROCHLORIDE 100 MG/1
TABLET, FILM COATED ORAL DAILY
COMMUNITY

## 2022-01-19 RX ORDER — SODIUM CHLORIDE 0.9 % (FLUSH) 0.9 %
5-40 SYRINGE (ML) INJECTION EVERY 8 HOURS
Status: DISCONTINUED | OUTPATIENT
Start: 2022-01-19 | End: 2022-01-19 | Stop reason: HOSPADM

## 2022-01-19 RX ADMIN — PROPOFOL 50 MG: 10 INJECTION, EMULSION INTRAVENOUS at 10:34

## 2022-01-19 RX ADMIN — LIDOCAINE HYDROCHLORIDE 50 MG: 20 INJECTION, SOLUTION EPIDURAL; INFILTRATION; INTRACAUDAL; PERINEURAL at 10:32

## 2022-01-19 RX ADMIN — PROPOFOL 30 MG: 10 INJECTION, EMULSION INTRAVENOUS at 10:36

## 2022-01-19 RX ADMIN — PROPOFOL 30 MG: 10 INJECTION, EMULSION INTRAVENOUS at 10:45

## 2022-01-19 RX ADMIN — SODIUM CHLORIDE: 900 INJECTION, SOLUTION INTRAVENOUS at 09:55

## 2022-01-19 RX ADMIN — PROPOFOL 30 MG: 10 INJECTION, EMULSION INTRAVENOUS at 10:39

## 2022-01-19 RX ADMIN — PROPOFOL 50 MG: 10 INJECTION, EMULSION INTRAVENOUS at 10:32

## 2022-01-19 RX ADMIN — PROPOFOL 30 MG: 10 INJECTION, EMULSION INTRAVENOUS at 10:42

## 2022-01-19 NOTE — ROUTINE PROCESS
Shahla Chamorrorebecca  1945  [id number]    Situation:  Verbal report received from: [facility]  Procedure: Procedure(s):  COLONOSCOPY (URGENT)        :-  ENDOSCOPIC POLYPECTOMY    Background:    Preoperative diagnosis: SCREENING, CHRONIC ATRIAL FIBRILLATION  Postoperative diagnosis: 1. Colon Polyp  2. Internal Hemorrhoids  3.  Diverticulosis    :  Dr. Celeste Mancia  Assistant(s): Endoscopy Technician-1: Kevyn Sawyer  Endoscopy RN-1: Kenroy Schumacher RN    Specimens:   ID Type Source Tests Collected by Time Destination   1 : Polyp Preservative Sigmoid  Cristian Staley MD 1/19/2022 1045 Pathology     H. Pylori  -no    Assessment:  Intra-procedure medications     Anesthesia gave intra-procedure sedation and medications, see anesthesia flow sheet     Intravenous fluids: NS@ KVO     Vital signs stable     Abdominal assessment: round and soft     Recommendation:  Discharge patient per MD order  Family -yes  Permission to share finding with family -yes

## 2022-01-19 NOTE — PROCEDURES
[address]  217 [facility] [address] [place]  1400 [facility], [address]  [phone]                              Colonoscopy Procedure Note      Indications:    Screening colonoscopy     :  Haim Verdin MD    Surgical Assistant: Endoscopy Technician-1: Abiola Harris  Endoscopy RN-1: Octavio Jefferson RN    Implants: none    Referring Provider: Sherry Hampton MD    Sedation:  MAC anesthesia    Procedure Details:  After informed consent was obtained with all risks and benefits of procedure explained and preoperative exam completed, the patient was taken to the endoscopy suite and placed in the left lateral decubitus position. Upon sequential sedation as per above, a digital rectal exam was performed  And was normal.  The Olympus videocolonoscope  was inserted in the rectum and carefully advanced to the cecum, which was identified by the ileocecal valve and appendiceal orifice. The quality of preparation was good. The colonoscope was slowly withdrawn with careful evaluation between folds. Retroflexion in the rectum was performed and was normal..     Findings:   Rectum: no mucosal lesion appreciated  Grade 1 internal hemorrhoid(s); Sigmoid: 1  Sessile polyp(s), the largest 6 mm in size;      - Diverticulosis  Descending Colon: no mucosal lesion appreciated  Transverse Colon: no mucosal lesion appreciated  Ascending Colon: no mucosal lesion appreciated  Cecum: no mucosal lesion appreciated  Terminal Ileum: not intubated    Interventions:  1 complete polypectomy were performed using cold snare  and the polyps were  retrieved    Specimen Removed:    ID Type Source Tests Collected by Time Destination   1 : Polyp Preservative Sigmoid  Peace Manzano MD 1/19/2022 1045 Pathology       Complications: None. EBL:  None. Recommendations: -Await pathology. -Repeat colonoscopy in 5 years.  -High fiber diet.     -Resume Pradaxa on 1/21/22 and rest of the medication(s) today.   -NO aspirin for 7 days     Discharge Disposition:  Home in the company of a  when able to ambulate.     Vu Norton MD  2022  10:53 AM

## 2022-01-19 NOTE — H&P
118 [facility].  217 [facility] 140 [facility], [address]  [phone]                                History and Physical     NAME: Nanci Mccauley   :  1945   MRN:  [id number]     HPI:  The patient was seen and examined. Past Surgical History:   Procedure Laterality Date    HX CHOLECYSTECTOMY       Past Medical History:   Diagnosis Date    Arthritis     Atrial fibrillation ([address]     Diabetes ([facility] [address]     Hypercholesterolemia     Hypertension      Social History     Tobacco Use    Smoking status: Never Smoker    Smokeless tobacco: Never Used   Substance Use Topics    Alcohol use: Yes     Alcohol/week: 1.0 standard drink     Types: 1 Glasses of wine per week    Drug use: Never     No Known Allergies  History reviewed. No pertinent family history. Current Facility-Administered Medications   Medication Dose Route Frequency    0.9% sodium chloride infusion  50 mL/hr IntraVENous CONTINUOUS    sodium chloride (NS) flush 5-40 mL  5-40 mL IntraVENous Q8H    sodium chloride (NS) flush 5-40 mL  5-40 mL IntraVENous PRN    naloxone (NARCAN) injection 0.4 mg  0.4 mg IntraVENous Multiple    flumazeniL (ROMAZICON) 0.1 mg/mL injection 0.2 mg  0.2 mg IntraVENous Multiple    simethicone (MYLICON) 82KP/9.0VB oral drops 80 mg  1.2 mL Oral Multiple    atropine injection 0.5 mg  0.5 mg IntraVENous ONCE PRN    EPINEPHrine (ADRENALIN) 0.1 mg/mL syringe 1 mg  1 mg Endoscopically ONCE PRN         PHYSICAL EXAM:  General: WD, WN. Alert, cooperative, no acute distress    HEENT: NC, Atraumatic. PERRLA, EOMI. Anicteric sclerae. Lungs:  CTA Bilaterally. No Wheezing/Rhonchi/Rales. Heart:  Regular  rhythm,  No murmur, No Rubs, No Gallops  Abdomen: Soft, Non distended, Non tender. +Bowel sounds, no HSM  Extremities: No c/c/e  Neurologic:  CN 2-12 gi, Alert and oriented X 3. No acute neurological distress   Psych:   Good insight. Not anxious nor agitated.     The heart, lungs and mental status were satisfactory for the administration of MAC sedation and for the procedure. Mallampati score: 2     The patient was counseled at length about the risks of oni Covid-19 in the chanell-operative and post-operative states including the recovery window of their procedure. The patient was made aware that oni Covid-19 after a surgical procedure may worsen their prognosis for recovering from the virus and lend to a higher morbidity and or mortality risk. The patient was given the options of postponing their procedure. All of the risks, benefits, and alternatives were discussed. The patient does  wish to proceed with the procedure.       Assessment:   · Screening colonoscopy    Plan:   · Endoscopic procedure  · MAC sedation

## 2022-01-19 NOTE — ANESTHESIA POSTPROCEDURE EVALUATION
Post-Anesthesia Evaluation and Assessment    Patient: Shania Burroughs MRN: [id number]  SSN: [id number]    YOB: 1945  Age: 78 y.o. Sex: female      I have evaluated the patient and they are stable and ready for discharge from the PACU. Cardiovascular Function/Vital Signs  Visit Vitals  /75   Pulse 68   Temp 36.4 °C (97.5 °F)   Resp 18   Ht 5' 3\" (1.6 m)   Wt 81.2 kg (179 lb)   SpO2 94%   BMI 31.71 kg/m²       Patient is status post MAC anesthesia for Procedure(s):  COLONOSCOPY (URGENT)        :-  ENDOSCOPIC POLYPECTOMY. Nausea/Vomiting: None    Postoperative hydration reviewed and adequate. Pain:  Pain Scale 1: Numeric (0 - 10) (01/19/22 1120)  Pain Intensity 1: 0 (01/19/22 1120)   Managed    Neurological Status: At baseline    Mental Status, Level of Consciousness: Alert and  oriented to person, place, and time    Pulmonary Status:   O2 Device: None (Room air) (01/19/22 1120)   Adequate oxygenation and airway patent    Complications related to anesthesia: None    Post-anesthesia assessment completed. No concerns    Signed By: Constance Betancourt MD     January 19, 2022              Procedure(s):  COLONOSCOPY (URGENT)        :-  ENDOSCOPIC POLYPECTOMY. MAC    <BSHSIANPOST>    INITIAL Post-op Vital signs:   Vitals Value Taken Time   /75 01/19/22 1130   Temp 36.4 °C (97.5 °F) 01/19/22 1056   Pulse 60 01/19/22 1134   Resp 16 01/19/22 1134   SpO2 96 % 01/19/22 1132   Vitals shown include unvalidated device data.

## 2022-01-19 NOTE — PROGRESS NOTES

## 2022-01-19 NOTE — ANESTHESIA PREPROCEDURE EVALUATION
Relevant Problems   No relevant active problems       Anesthetic History   No history of anesthetic complications            Review of Systems / Medical History  Patient summary reviewed, nursing notes reviewed and pertinent labs reviewed    Pulmonary  Within defined limits                 Neuro/Psych   Within defined limits           Cardiovascular    Hypertension: well controlled        Dysrhythmias : atrial fibrillation      Exercise tolerance: >4 METS     GI/Hepatic/Renal                Endo/Other    Diabetes    Arthritis     Other Findings              Physical Exam    Airway  Mallampati: I  TM Distance: > 6 cm  Neck ROM: normal range of motion   Mouth opening: Normal     Cardiovascular    Rhythm: regular           Dental  No notable dental hx       Pulmonary  Breath sounds clear to auscultation               Abdominal         Other Findings            Anesthetic Plan    ASA: 2  Anesthesia type: MAC          Induction: Intravenous  Anesthetic plan and risks discussed with: Patient

## 2022-01-19 NOTE — DISCHARGE INSTRUCTIONS
[address] [facility] [address]  217 [facility] 730 [facility]                                  Gerda Brand  [id number]  1945    COLON DISCHARGE INSTRUCTIONS    DISCOMFORT:  Redness at IV site- apply warm compress to area; if redness or soreness persist- contact your physician  There may be a slight amount of blood passed from the rectum  Gaseous discomfort- walking, belching will help relieve any discomfort      DIET:   High fiber diet. - however -  remember your colon is empty and a heavy meal will produce gas. Avoid these foods:  vegetables, fried / greasy foods, carbonated drinks for today  You may not  drink alcoholic beverages for at least 12 hours     ACTIVITY:  It is recommended that you spend the remainder of the day resting -  avoid any strenuous activity. You may not operate a vehicle for 12 hours  You may not  engage in an occupation involving machinery or appliances for rest of today    Avoid making any critical decisions for at least 24 hour    CALL M.D. ANY SIGN OF:   Increasing pain, nausea, vomiting  Abdominal distension (swelling)  New increased bleeding (oral or rectal)  Fever (chills)  Pain in chest area  Bloody discharge from nose or mouth  Shortness of breath    You may not  take any Advil, Aspirin, Ibuprofen, Motrin, Aleve, or Goodys for 7 days, ONLY  Tylenol as needed for pain. Post procedure diagnosis: 1. Colon Polyp  2. Internal Hemorrhoids  3. Diverticulosis      Post-procedure recommendations:-Await pathology. -Repeat colonoscopy in 5 years.  -High fiber diet.     -Resume Pradaxa on 1/21/22 and rest of the medication(s) today. -NO aspirin for 7 days     Follow-up Instructions:    Call Dr. Ponce Ellis for any questions or problems. If we took a biopsy please call the office within 2 weeks to discuss your  pathology results.  Telephone # [phone]         Learning About Coronavirus [phone])  Coronavirus [phone]): Overview  What is coronavirus (PKIMZ-18)? The coronavirus disease (COVID-19) is caused by a virus. It is an illness that was first found in [place], [place], in December 2019. It has since spread worldwide. The virus can cause fever, cough, and trouble breathing. In severe cases, it can cause pneumonia and make it hard to breathe without help. It can cause death. Coronaviruses are a large group of viruses. They cause the common cold. They also cause more serious illnesses like Middle East respiratory syndrome (MERS) and severe acute respiratory syndrome (SARS). COVID-19 is caused by a novel coronavirus. That means it's a new type that has not been seen in people before. This virus spreads person-to-person through droplets from coughing and sneezing. It can also spread when you are close to someone who is infected. And it can spread when you touch something that has the virus on it, such as a doorknob or a tabletop. What can you do to protect yourself from coronavirus (COVID-19)? The best way to protect yourself from getting sick is to:  · Avoid areas where there is an outbreak. · Avoid contact with people who may be infected. · Wash your hands often with soap or alcohol-based hand sanitizers. · Avoid crowds and try to stay at least 6 feet away from other people. · Wash your hands often, especially after you cough or sneeze. Use soap and water, and scrub for at least 20 seconds. If soap and water aren't available, use an alcohol-based hand . · Avoid touching your mouth, nose, and eyes. What can you do to avoid spreading the virus to others? To help avoid spreading the virus to others:  · Cover your mouth with a tissue when you cough or sneeze. Then throw the tissue in the trash. · Use a disinfectant to clean things that you touch often. · Stay home if you are sick or have been exposed to the virus. Don't go to school, work, or public areas. And don't use public transportation.   · If you are sick:  ? Leave your home only if you need to get medical care. But call the doctor's office first so they know you're coming. And wear a face mask, if you have one.  ? If you have a face mask, wear it whenever you're around other people. It can help stop the spread of the virus when you cough or sneeze. ? Clean and disinfect your home every day. Use household  and disinfectant wipes or sprays. Take special care to clean things that you grab with your hands. These include doorknobs, remote controls, phones, and handles on your refrigerator and microwave. And don't forget countertops, tabletops, bathrooms, and computer keyboards. When to call for help  Call 911 anytime you think you may need emergency care. For example, call if:  · You have severe trouble breathing. (You can't talk at all.)  · You have constant chest pain or pressure. · You are severely dizzy or lightheaded. · You are confused or can't think clearly. · Your face and lips have a blue color. · You pass out (lose consciousness) or are very hard to wake up. Call your doctor now if you develop symptoms such as:  · Shortness of breath. · Fever. · Cough. If you need to get care, call ahead to the doctor's office for instructions before you go. Make sure you wear a face mask, if you have one, to prevent exposing other people to the virus. Where can you get the latest information? The following health organizations are tracking and studying this virus. Their websites contain the most up-to-date information. Monalisa Lee also learn what to do if you think you may have been exposed to the virus. · U.S. Centers for Disease Control and Prevention (CDC): The CDC provides updated news about the disease and travel advice. The website also tells you how to prevent the spread of infection. www.cdc.gov  · World Health Organization [facility]): WHO offers information about the virus outbreaks.  WHO also has travel advice. www.who.int  Current as of: April 1, 2020               Content Version: 12.4  © 5714-9590 [employer]. Care instructions adapted under license by your healthcare professional. If you have questions about a medical condition or this instruction, always ask your healthcare professional. [address] any warranty or liability for your use of this information.

## (undated) DEVICE — FORCEPS BX L240CM JAW DIA2.8MM L CAP W/ NDL MIC MESH TOOTH